# Patient Record
Sex: FEMALE | Race: WHITE
[De-identification: names, ages, dates, MRNs, and addresses within clinical notes are randomized per-mention and may not be internally consistent; named-entity substitution may affect disease eponyms.]

---

## 2018-04-12 ENCOUNTER — HOSPITAL ENCOUNTER (EMERGENCY)
Dept: HOSPITAL 31 - C.ER | Age: 18
Discharge: HOME | End: 2018-04-12
Payer: MEDICAID

## 2018-04-12 VITALS
RESPIRATION RATE: 18 BRPM | DIASTOLIC BLOOD PRESSURE: 87 MMHG | OXYGEN SATURATION: 98 % | HEART RATE: 93 BPM | SYSTOLIC BLOOD PRESSURE: 131 MMHG | TEMPERATURE: 98.1 F
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
## 2018-04-12 DIAGNOSIS — Z23: ICD-10-CM

## 2018-04-12 DIAGNOSIS — S61.431A: Primary | ICD-10-CM

## 2018-04-12 NOTE — C.PDOC
History Of Present Illness


18 year old female presents to ED with complaints of right hand wound from 

broken glass medicine dropper in lab class today. She states it broke and a 

chip was on counter and she cut her hand. She denies any glass in wound. She 

quickly washed hand in classroom. 





Time Seen by Provider: 04/12/18 13:20


Chief Complaint (Nursing): Abnormal Skin Integrity


History Per: Patient


History/Exam Limitations: no limitations


Onset/Duration Of Symptoms: Hrs


Current Symptoms Are (Timing): Still Present


Severity: Moderate





Past Medical History


Reviewed: Historical Data, Nursing Documentation, Vital Signs


Vital Signs: 


 Last Vital Signs











Temp  98.1 F   04/12/18 13:18


 


Pulse  93 H  04/12/18 13:18


 


Resp  18   04/12/18 13:18


 


BP  131/87   04/12/18 13:18


 


Pulse Ox  98   04/12/18 15:14














- Medical History


PMH: Migraine


   Denies: Diabetes, Hepatitis, HIV, HTN, Seizures, Sexually Transmitted Disease


Surgical History: No Surg Hx





- [facility] Procedures








INDIVID PSYCHOTHERAP NEC (10/07/14)


OTHER GROUP THERAPY (10/07/14)








Family History: States: No Known Family Hx





- Social History


Hx Tobacco Use: No


Hx Alcohol Use: No


Hx Substance Use: No





- Immunization History


Hx Tetanus Toxoid Vaccination: No


Hx Influenza Vaccination: No


Hx Pneumococcal Vaccination: No





Review Of Systems


Except As Marked, All Systems Reviewed And Found Negative.


Skin: Positive for: Other (right hand wound)





Physical Exam





- Physical Exam


Appears: Non-toxic, No Acute Distress


Skin: Normal Color, Warm, Other (superficial puncture wound to right palm base, 

no active bleeding, no foreign body)


Head: Atraumatic, Normacephalic


Eye(s): bilateral: Normal Inspection


Nose: Normal


Oral Mucosa: Moist


Neck: Supple


Chest: Symmetrical


Neurological/Psych: Oriented x3, Normal Speech





ED Course And Treatment


O2 Sat by Pulse Oximetry: 98 (RA)


Pulse Ox Interpretation: Normal





Medical Decision Making


Medical Decision Making: 





Wound cleansed and irrigated with NS. No signs of foreign body. Tetanus 

administered. Bacitracin and dressing applied. 





Disposition


Counseled Patient/Family Regarding: Diagnosis, Need For Followup, Rx Given





- Disposition


Referrals: 


Vasu Bolaños MD [Staff Provider] - 


Disposition: HOME/ ROUTINE


Disposition Time: 13:28


Condition: GOOD


Additional Instructions: 


Keep area clean and dry. May wash gently with soap and water, do not use 

alcohol or iodine solution. Change dressing 1-2 times daily.  Return to ER if 

fever occurs, redness or swelling around wound, pus in the wound.


Instructions:  Wound Care (DC)


Forms:  [facility] (English), School Excuse





- POA


Present On Arrival: None





- Clinical Impression


Clinical Impression: 


 Puncture wound of hand








- PA / NP / Resident Statement


MD/DO has reviewed & agrees with the documentation as recorded.





- Scribe Statement


The provider has reviewed the documentation as recorded by the Chemaibbyron Sharma





Provider Attestation





All medical record entries made by the Chemaibbyron were at my direction and 

personally dictated by me. I have reviewed the chart and agree that the record 

accurately reflects my personal performance of the history, physical exam, 

medical decision making, and the department course for this patient. I have 

also personally directed, reviewed, and agree with the discharge instructions 

and disposition.